# Patient Record
Sex: MALE | Race: OTHER
[De-identification: names, ages, dates, MRNs, and addresses within clinical notes are randomized per-mention and may not be internally consistent; named-entity substitution may affect disease eponyms.]

---

## 2022-03-27 ENCOUNTER — HOSPITAL ENCOUNTER (INPATIENT)
Dept: HOSPITAL 12 - ER | Age: 37
LOS: 4 days | Discharge: HOME HEALTH | DRG: 896 | End: 2022-03-31
Payer: MEDICARE

## 2022-03-27 VITALS — BODY MASS INDEX: 37.26 KG/M2 | WEIGHT: 210.31 LBS | HEIGHT: 63 IN

## 2022-03-27 VITALS — SYSTOLIC BLOOD PRESSURE: 99 MMHG | DIASTOLIC BLOOD PRESSURE: 67 MMHG

## 2022-03-27 VITALS — DIASTOLIC BLOOD PRESSURE: 61 MMHG | SYSTOLIC BLOOD PRESSURE: 103 MMHG

## 2022-03-27 DIAGNOSIS — F14.10: ICD-10-CM

## 2022-03-27 DIAGNOSIS — E88.09: ICD-10-CM

## 2022-03-27 DIAGNOSIS — Z79.4: ICD-10-CM

## 2022-03-27 DIAGNOSIS — N39.0: ICD-10-CM

## 2022-03-27 DIAGNOSIS — G90.8: ICD-10-CM

## 2022-03-27 DIAGNOSIS — E11.65: ICD-10-CM

## 2022-03-27 DIAGNOSIS — E66.9: ICD-10-CM

## 2022-03-27 DIAGNOSIS — Z71.3: ICD-10-CM

## 2022-03-27 DIAGNOSIS — G40.89: ICD-10-CM

## 2022-03-27 DIAGNOSIS — F19.10: ICD-10-CM

## 2022-03-27 DIAGNOSIS — Z20.822: ICD-10-CM

## 2022-03-27 DIAGNOSIS — F17.210: ICD-10-CM

## 2022-03-27 DIAGNOSIS — R07.9: ICD-10-CM

## 2022-03-27 DIAGNOSIS — E44.1: ICD-10-CM

## 2022-03-27 DIAGNOSIS — Z71.6: ICD-10-CM

## 2022-03-27 DIAGNOSIS — I46.9: ICD-10-CM

## 2022-03-27 DIAGNOSIS — R55: ICD-10-CM

## 2022-03-27 DIAGNOSIS — Z79.84: ICD-10-CM

## 2022-03-27 DIAGNOSIS — J45.909: ICD-10-CM

## 2022-03-27 DIAGNOSIS — E78.5: ICD-10-CM
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
## 2022-03-27 DIAGNOSIS — D72.828: ICD-10-CM

## 2022-03-27 DIAGNOSIS — F10.139: Primary | ICD-10-CM

## 2022-03-27 DIAGNOSIS — E83.51: ICD-10-CM

## 2022-03-27 LAB
ALP SERPL-CCNC: 87 U/L (ref 50–136)
ALT SERPL W/O P-5'-P-CCNC: 59 U/L (ref 16–63)
AMPHETAMINES UR QL SCN>1000 NG/ML: NEGATIVE
APPEARANCE UR: CLEAR
AST SERPL-CCNC: 22 U/L (ref 15–37)
BILIRUB DIRECT SERPL-MCNC: 0.1 MG/DL (ref 0–0.2)
BILIRUB SERPL-MCNC: 0.2 MG/DL (ref 0.2–1)
BILIRUB UR QL STRIP: NEGATIVE
BUN SERPL-MCNC: 14 MG/DL (ref 7–18)
CHLORIDE SERPL-SCNC: 99 MMOL/L (ref 98–107)
CO2 SERPL-SCNC: 33 MMOL/L (ref 21–32)
COCAINE UR QL SCN: NEGATIVE
COLOR UR: (no result)
CREAT SERPL-MCNC: 0.6 MG/DL (ref 0.6–1.3)
DEPRECATED SQUAMOUS URNS QL MICRO: (no result) /HPF
ETHANOL SERPL-MCNC: < 3 MG/DL (ref 0–0)
GLUCOSE SERPL-MCNC: 172 MG/DL (ref 74–106)
GLUCOSE UR STRIP-MCNC: NEGATIVE MG/DL
HCT VFR BLD AUTO: 41.6 % (ref 36.7–47.1)
HGB UR QL STRIP: NEGATIVE
KETONES UR STRIP-MCNC: NEGATIVE MG/DL
LEUKOCYTE ESTERASE UR QL STRIP: (no result)
MCH RBC QN AUTO: 26.2 UUG (ref 23.8–33.4)
MCV RBC AUTO: 81 FL (ref 73–96.2)
NITRITE UR QL STRIP: NEGATIVE
OPIATES UR QL SCN: NEGATIVE
PCP UR QL SCN>25 NG/ML: NEGATIVE
PH UR STRIP: 6 [PH] (ref 5–8)
PLATELET # BLD AUTO: 265 K/UL (ref 152–348)
POTASSIUM SERPL-SCNC: 4.1 MMOL/L (ref 3.5–5.1)
RBC #/AREA URNS HPF: (no result) /HPF (ref 0–3)
SP GR UR STRIP: 1.01 (ref 1–1.03)
THC UR QL SCN>50 NG/ML: NEGATIVE
UROBILINOGEN UR STRIP-MCNC: 0.2 E.U./DL
WBC #/AREA URNS HPF: (no result) /HPF
WBC #/AREA URNS HPF: (no result) /HPF (ref 0–3)
WS STN SPEC: 6.6 G/DL (ref 6.4–8.2)

## 2022-03-27 PROCEDURE — G0480 DRUG TEST DEF 1-7 CLASSES: HCPCS

## 2022-03-27 PROCEDURE — A4663 DIALYSIS BLOOD PRESSURE CUFF: HCPCS

## 2022-03-27 PROCEDURE — G0378 HOSPITAL OBSERVATION PER HR: HCPCS

## 2022-03-27 RX ADMIN — Medication PRN MG: at 22:14

## 2022-03-27 RX ADMIN — NICOTINE SCH MG: 21 PATCH, EXTENDED RELEASE TOPICAL at 22:14

## 2022-03-27 RX ADMIN — HYDROCODONE BITARTRATE AND ACETAMINOPHEN PRN TAB: 5; 325 TABLET ORAL at 22:26

## 2022-03-27 RX ADMIN — Medication SCH EACH: at 21:39

## 2022-03-27 RX ADMIN — DOCUSATE SODIUM SCH MG: 100 CAPSULE, LIQUID FILLED ORAL at 21:34

## 2022-03-27 RX ADMIN — SULFAMETHOXAZOLE AND TRIMETHOPRIM SCH TAB: 800; 160 TABLET ORAL at 21:34

## 2022-03-27 RX ADMIN — SODIUM CHLORIDE PRN UNIT: 9 INJECTION, SOLUTION INTRAVENOUS at 21:43

## 2022-03-27 NOTE — NUR
at 2214h Ambien 5mg prn given to pt as per pt request for sleep. At 2226H Norco 5-325 mg prn 
1 tablet given  for stomach pain. Pt tolerated it well. After an hour pt Ambien and Norco 
effective because pt is sleeping soundly with no acute distress. Will continue to monitor.

## 2022-03-27 NOTE — NUR
Received pt in no acute distress. Pt alert and orientedx3. Admission process and care plan 
initiated. Belonging list done. Skilled nursing assessment done. Safety and comfort 
provided. Will continue to monitor.

## 2022-03-27 NOTE — NUR
ADMITTED FROM ER A 35 YO MALE ALERT AND RESPONDING APPROPRIATELY WITH SIMPLE QUESTIONS, 
ORIENTED TO ROOM, VS TAKEN. DINNER SERVED WITHOUT ANY SIGNS OF ASPIRATION. REPORT GIVEN TO 
NIGHT SHIFT

## 2022-03-28 VITALS — SYSTOLIC BLOOD PRESSURE: 108 MMHG | DIASTOLIC BLOOD PRESSURE: 67 MMHG

## 2022-03-28 VITALS — SYSTOLIC BLOOD PRESSURE: 100 MMHG | DIASTOLIC BLOOD PRESSURE: 63 MMHG

## 2022-03-28 VITALS — DIASTOLIC BLOOD PRESSURE: 59 MMHG | SYSTOLIC BLOOD PRESSURE: 93 MMHG

## 2022-03-28 VITALS — DIASTOLIC BLOOD PRESSURE: 71 MMHG | SYSTOLIC BLOOD PRESSURE: 103 MMHG

## 2022-03-28 VITALS — SYSTOLIC BLOOD PRESSURE: 100 MMHG | DIASTOLIC BLOOD PRESSURE: 70 MMHG

## 2022-03-28 VITALS — SYSTOLIC BLOOD PRESSURE: 100 MMHG | DIASTOLIC BLOOD PRESSURE: 60 MMHG

## 2022-03-28 LAB
BUN SERPL-MCNC: 12 MG/DL (ref 7–18)
CHLORIDE SERPL-SCNC: 101 MMOL/L (ref 98–107)
CHOLEST SERPL-MCNC: 166 MG/DL (ref ?–200)
CO2 SERPL-SCNC: 29 MMOL/L (ref 21–32)
CREAT SERPL-MCNC: 0.6 MG/DL (ref 0.6–1.3)
GLUCOSE SERPL-MCNC: 176 MG/DL (ref 74–106)
HCT VFR BLD AUTO: 40 % (ref 36.7–47.1)
HDLC SERPL-MCNC: 38 MG/DL (ref 40–60)
MAGNESIUM SERPL-MCNC: 1.9 MG/DL (ref 1.8–2.4)
MCH RBC QN AUTO: 26.6 UUG (ref 23.8–33.4)
MCV RBC AUTO: 81.4 FL (ref 73–96.2)
PHOSPHATE SERPL-MCNC: 3.9 MG/DL (ref 2.5–4.9)
PLATELET # BLD AUTO: 234 K/UL (ref 152–348)
POTASSIUM SERPL-SCNC: 4 MMOL/L (ref 3.5–5.1)
TRIGL SERPL-MCNC: 144 MG/DL (ref 30–150)
TSH SERPL DL<=0.005 MIU/L-ACNC: 1.57 MIU/ML (ref 0.36–3.74)

## 2022-03-28 RX ADMIN — Medication SCH EACH: at 06:33

## 2022-03-28 RX ADMIN — SULFAMETHOXAZOLE AND TRIMETHOPRIM SCH TAB: 800; 160 TABLET ORAL at 20:34

## 2022-03-28 RX ADMIN — HYDROCODONE BITARTRATE AND ACETAMINOPHEN PRN TAB: 5; 325 TABLET ORAL at 23:00

## 2022-03-28 RX ADMIN — PANTOPRAZOLE SODIUM SCH MG: 40 TABLET, DELAYED RELEASE ORAL at 06:32

## 2022-03-28 RX ADMIN — FOLIC ACID SCH MG: 1 TABLET ORAL at 08:47

## 2022-03-28 RX ADMIN — SODIUM CHLORIDE PRN UNIT: 9 INJECTION, SOLUTION INTRAVENOUS at 20:36

## 2022-03-28 RX ADMIN — DOCUSATE SODIUM SCH MG: 100 CAPSULE, LIQUID FILLED ORAL at 20:34

## 2022-03-28 RX ADMIN — ATORVASTATIN CALCIUM SCH MG: 40 TABLET, FILM COATED ORAL at 17:16

## 2022-03-28 RX ADMIN — Medication SCH MG: at 08:46

## 2022-03-28 RX ADMIN — Medication SCH EACH: at 11:32

## 2022-03-28 RX ADMIN — SODIUM CHLORIDE PRN UNIT: 9 INJECTION, SOLUTION INTRAVENOUS at 18:31

## 2022-03-28 RX ADMIN — SULFAMETHOXAZOLE AND TRIMETHOPRIM SCH TAB: 800; 160 TABLET ORAL at 08:46

## 2022-03-28 RX ADMIN — HYDROCODONE BITARTRATE AND ACETAMINOPHEN PRN TAB: 5; 325 TABLET ORAL at 08:47

## 2022-03-28 RX ADMIN — NICOTINE SCH MG: 21 PATCH, EXTENDED RELEASE TOPICAL at 10:45

## 2022-03-28 RX ADMIN — Medication SCH EACH: at 20:34

## 2022-03-28 RX ADMIN — Medication SCH EACH: at 16:42

## 2022-03-28 RX ADMIN — SODIUM CHLORIDE PRN MG: 9 INJECTION, SOLUTION INTRAVENOUS at 23:50

## 2022-03-28 NOTE — NUR
Patient remains afebrile. No seizure event noted. Rails remained padded. Bed alarm on. 
Denies pain and SOB. Remains A/O x4. TELE SR at 81. Comfort and safety measures maintained 
t/o shift. All meds given as ordered. All needs met.

## 2022-03-28 NOTE — NUR
Received patient laying in bed. Awake, alert oriented x 3. Denies pain or SOB. Tele SR on 
room air. Pt. able to walk to the restroom but with 1 per assist. Safety initiated. Call 
light within reach. Will continue to monitor.

## 2022-03-28 NOTE — NUR
Pt slept comfortably.Prescribed medication given and pt tolerated it well. Pt had tendency 
to not answer staff . Pt on sinus rhythm. Seizure precaution noted. Vital signs within 
normal limit. Safety and comfort provided. Will endorse to incoming nurse.

## 2022-03-28 NOTE — NUR
Patient was found sitting on the floor. Patient stated, "I was trying to get comfortable, 
had a seizure and fell." Patient claimed he hit his head on the floor and that his head and 
chest hurts. VS taken T=98.9, HR=74, RR=16, /80, O2 sat= 97%. No events noted on the 
monitor. No signs of swelling nor hematoma noted on patient's head. Patient was placed back 
on the bed, bed alarm on and call light within reach. Charge nurse Srinivasan, nursing 
supervisor Isa Alvarez [place] and Dr Hernandez made aware. Per Dr. Hernandez, no new orders, just 
observe. Will continue to monitor closely.

## 2022-03-28 NOTE — NUR
Patient leaning to siderails, dangling legs, wanting to get up. Reoriented patient, 
explained risk and benefits x3. Verbalized understanding. Placed patient back to bed. Will 
continue to monitor closely.

## 2022-03-28 NOTE — NUR
ORTHOSTATIC VITAL SIGNS

Laying 107/70 HR 73

Sitting 104/73 HR 77

Standing 100/60 HR 81



Will continue to monitor.

## 2022-03-28 NOTE — NUR
Patient was found sitting on the floor. Patient stated, "I was trying to get comfortable, 
had a seizure and fell." Patient claimed he hit his head on the floor and that his head and 
chest hurts. VS taken T=98.9, HR=74, RR=16, /80, O2 sat= 97%. No events noted on the 
monitor. No signs of swelling nor hematoma noted on patient's head. Patient was placed back 
on the bed, bed alarm on and call light within reach. Charge nurse Srinivasan, nursing 
supervisor Isa Alvarez [place] and Dr Hernanedz made aware. Per Dr. Hernandez, no new orders, just 
observe. Will continue to monitor closely. 

-------------------------------------------------------------------------------

Addendum: 03/28/22 at 2144 by Minal Sam RN

-------------------------------------------------------------------------------

Wrong time.

## 2022-03-29 VITALS — DIASTOLIC BLOOD PRESSURE: 63 MMHG | SYSTOLIC BLOOD PRESSURE: 98 MMHG

## 2022-03-29 VITALS — DIASTOLIC BLOOD PRESSURE: 57 MMHG | SYSTOLIC BLOOD PRESSURE: 96 MMHG

## 2022-03-29 VITALS — DIASTOLIC BLOOD PRESSURE: 73 MMHG | SYSTOLIC BLOOD PRESSURE: 104 MMHG

## 2022-03-29 VITALS — SYSTOLIC BLOOD PRESSURE: 110 MMHG | DIASTOLIC BLOOD PRESSURE: 71 MMHG

## 2022-03-29 VITALS — DIASTOLIC BLOOD PRESSURE: 68 MMHG | SYSTOLIC BLOOD PRESSURE: 112 MMHG

## 2022-03-29 LAB
BUN SERPL-MCNC: 10 MG/DL (ref 7–18)
CHLORIDE SERPL-SCNC: 100 MMOL/L (ref 98–107)
CO2 SERPL-SCNC: 33 MMOL/L (ref 21–32)
CREAT SERPL-MCNC: 0.8 MG/DL (ref 0.6–1.3)
GLUCOSE SERPL-MCNC: 197 MG/DL (ref 74–106)
HCT VFR BLD AUTO: 43.6 % (ref 36.7–47.1)
MAGNESIUM SERPL-MCNC: 2.1 MG/DL (ref 1.8–2.4)
MCH RBC QN AUTO: 26.8 UUG (ref 23.8–33.4)
MCV RBC AUTO: 80.7 FL (ref 73–96.2)
PHOSPHATE SERPL-MCNC: 4.5 MG/DL (ref 2.5–4.9)
PLATELET # BLD AUTO: 259 K/UL (ref 152–348)
POTASSIUM SERPL-SCNC: 4.1 MMOL/L (ref 3.5–5.1)

## 2022-03-29 RX ADMIN — NICOTINE SCH MG: 21 PATCH, EXTENDED RELEASE TOPICAL at 08:13

## 2022-03-29 RX ADMIN — SODIUM CHLORIDE PRN UNIT: 9 INJECTION, SOLUTION INTRAVENOUS at 21:11

## 2022-03-29 RX ADMIN — Medication SCH EACH: at 21:09

## 2022-03-29 RX ADMIN — Medication PRN MG: at 00:31

## 2022-03-29 RX ADMIN — ATORVASTATIN CALCIUM SCH MG: 40 TABLET, FILM COATED ORAL at 17:01

## 2022-03-29 RX ADMIN — DOCUSATE SODIUM SCH MG: 100 CAPSULE, LIQUID FILLED ORAL at 20:44

## 2022-03-29 RX ADMIN — SULFAMETHOXAZOLE AND TRIMETHOPRIM SCH TAB: 800; 160 TABLET ORAL at 08:13

## 2022-03-29 RX ADMIN — SODIUM CHLORIDE PRN UNIT: 9 INJECTION, SOLUTION INTRAVENOUS at 11:55

## 2022-03-29 RX ADMIN — HYDROCODONE BITARTRATE AND ACETAMINOPHEN PRN TAB: 5; 325 TABLET ORAL at 08:20

## 2022-03-29 RX ADMIN — PRAZOSIN HYDROCHLORIDE SCH MG: 1 CAPSULE ORAL at 20:46

## 2022-03-29 RX ADMIN — Medication SCH EACH: at 06:37

## 2022-03-29 RX ADMIN — SODIUM CHLORIDE PRN UNIT: 9 INJECTION, SOLUTION INTRAVENOUS at 08:13

## 2022-03-29 RX ADMIN — SULFAMETHOXAZOLE AND TRIMETHOPRIM SCH TAB: 800; 160 TABLET ORAL at 20:44

## 2022-03-29 RX ADMIN — Medication SCH MG: at 08:13

## 2022-03-29 RX ADMIN — TRAZODONE HYDROCHLORIDE SCH MG: 50 TABLET ORAL at 20:44

## 2022-03-29 RX ADMIN — Medication SCH EACH: at 11:53

## 2022-03-29 RX ADMIN — FOLIC ACID SCH MG: 1 TABLET ORAL at 08:13

## 2022-03-29 RX ADMIN — Medication SCH EACH: at 16:19

## 2022-03-29 RX ADMIN — ESCITALOPRAM OXALATE SCH MG: 10 TABLET, FILM COATED ORAL at 13:17

## 2022-03-29 RX ADMIN — PANTOPRAZOLE SODIUM SCH MG: 40 TABLET, DELAYED RELEASE ORAL at 06:06

## 2022-03-29 RX ADMIN — SODIUM CHLORIDE PRN UNIT: 9 INJECTION, SOLUTION INTRAVENOUS at 16:20

## 2022-03-29 NOTE — NUR
no significant change from morning assessment. sr on telemonitor. no episodes of sob/chest 
pain/seizure. cont seizure and fall precautions at all times.

## 2022-03-29 NOTE — NUR
At this time case management Raul called and informed charge R.N that due to Insurance 
reasons (no coverage for ALS transportation) CM unable to arranged ALS transportation to [facility] 
tomorrow for scheduled cardiac CTA. Cardiologist Dr. Zaidi informed and not happy with 
outcome but agreed to have BLS transportation despite been contra-indicated pt. needs 
procedure to be done.

## 2022-03-29 NOTE — NUR
received sitting by the window comfortable. asked to go back to bed and assisted by 2 
licensed nurses. pt is currently eating comfortably. denies n/v/sob. bed at lowest, padded 
siderails in place, bed alarm on, call light in place. assisted with needs. cont to monitor

## 2022-03-29 NOTE — NUR
Called ARELIS and chi with Luís re: transportation to [facility] for CT angio.  time 
0830am tomorrow 3/30/22.

## 2022-03-29 NOTE — NUR
Patient c/o generalized pain states norco is ineffective requested Morphine instead. 
Informed NP Ke with new order noted.

## 2022-03-29 NOTE — NUR
A call from [facility] and at this time primary nurse Марина informed that pt. is schedule to have 
Cardiac CTA at [facility] at 1000. Care coordinator in charge of arranging ACLS transportation. 
Awaiting for information.

## 2022-03-29 NOTE — NUR
A call from [facility] to Mr. Garcia and at this time He was informed that due to transport delayed 
procedure approved by cardiologist Dr. Zaidi to be done any time tomorrow. Mr. Soliz 
stated "I'll be calling you earlier tomorrow to let you know at what time we'll be ready for 
procedure on 03/30/21." primary nurse nurse and supervisor updated on care plan.

## 2022-03-30 ENCOUNTER — HOSPITAL ENCOUNTER (OUTPATIENT)
Dept: HOSPITAL 54 - CT | Age: 37
Discharge: HOME | End: 2022-03-30
Attending: INTERNAL MEDICINE
Payer: MEDICARE

## 2022-03-30 VITALS — DIASTOLIC BLOOD PRESSURE: 75 MMHG | SYSTOLIC BLOOD PRESSURE: 104 MMHG

## 2022-03-30 VITALS — SYSTOLIC BLOOD PRESSURE: 94 MMHG | DIASTOLIC BLOOD PRESSURE: 63 MMHG

## 2022-03-30 VITALS — DIASTOLIC BLOOD PRESSURE: 63 MMHG | SYSTOLIC BLOOD PRESSURE: 100 MMHG

## 2022-03-30 VITALS — HEIGHT: 64 IN | BODY MASS INDEX: 35.68 KG/M2 | WEIGHT: 209 LBS

## 2022-03-30 VITALS — SYSTOLIC BLOOD PRESSURE: 121 MMHG | DIASTOLIC BLOOD PRESSURE: 79 MMHG

## 2022-03-30 VITALS — SYSTOLIC BLOOD PRESSURE: 97 MMHG | DIASTOLIC BLOOD PRESSURE: 62 MMHG

## 2022-03-30 DIAGNOSIS — R07.9: Primary | ICD-10-CM

## 2022-03-30 PROCEDURE — 75574 CT ANGIO HRT W/3D IMAGE: CPT

## 2022-03-30 RX ADMIN — SODIUM CHLORIDE PRN UNIT: 9 INJECTION, SOLUTION INTRAVENOUS at 09:32

## 2022-03-30 RX ADMIN — SODIUM CHLORIDE PRN UNIT: 9 INJECTION, SOLUTION INTRAVENOUS at 20:28

## 2022-03-30 RX ADMIN — SODIUM CHLORIDE PRN UNIT: 9 INJECTION, SOLUTION INTRAVENOUS at 11:44

## 2022-03-30 RX ADMIN — ATORVASTATIN CALCIUM SCH MG: 40 TABLET, FILM COATED ORAL at 17:04

## 2022-03-30 RX ADMIN — Medication PRN MG: at 10:40

## 2022-03-30 RX ADMIN — HYDROCODONE BITARTRATE AND ACETAMINOPHEN PRN TAB: 5; 325 TABLET ORAL at 17:41

## 2022-03-30 RX ADMIN — ESCITALOPRAM OXALATE SCH MG: 10 TABLET, FILM COATED ORAL at 09:00

## 2022-03-30 RX ADMIN — SODIUM CHLORIDE PRN MG: 9 INJECTION, SOLUTION INTRAVENOUS at 23:34

## 2022-03-30 RX ADMIN — SULFAMETHOXAZOLE AND TRIMETHOPRIM SCH TAB: 800; 160 TABLET ORAL at 20:26

## 2022-03-30 RX ADMIN — SODIUM CHLORIDE PRN MG: 9 INJECTION, SOLUTION INTRAVENOUS at 13:44

## 2022-03-30 RX ADMIN — NICOTINE SCH MG: 21 PATCH, EXTENDED RELEASE TOPICAL at 09:00

## 2022-03-30 RX ADMIN — Medication SCH MG: at 09:00

## 2022-03-30 RX ADMIN — Medication PRN MG: at 11:03

## 2022-03-30 RX ADMIN — Medication PRN MG: at 10:35

## 2022-03-30 RX ADMIN — Medication SCH EACH: at 11:43

## 2022-03-30 RX ADMIN — FOLIC ACID SCH MG: 1 TABLET ORAL at 09:00

## 2022-03-30 RX ADMIN — Medication SCH EACH: at 17:01

## 2022-03-30 RX ADMIN — HYDROCODONE BITARTRATE AND ACETAMINOPHEN PRN TAB: 5; 325 TABLET ORAL at 22:55

## 2022-03-30 RX ADMIN — PRAZOSIN HYDROCHLORIDE SCH MG: 1 CAPSULE ORAL at 20:27

## 2022-03-30 RX ADMIN — SULFAMETHOXAZOLE AND TRIMETHOPRIM SCH TAB: 800; 160 TABLET ORAL at 09:00

## 2022-03-30 RX ADMIN — Medication SCH EACH: at 06:43

## 2022-03-30 RX ADMIN — SODIUM CHLORIDE PRN UNIT: 9 INJECTION, SOLUTION INTRAVENOUS at 17:04

## 2022-03-30 RX ADMIN — PANTOPRAZOLE SODIUM SCH MG: 40 TABLET, DELAYED RELEASE ORAL at 06:28

## 2022-03-30 RX ADMIN — Medication SCH EACH: at 20:23

## 2022-03-30 RX ADMIN — TRAZODONE HYDROCHLORIDE SCH MG: 50 TABLET ORAL at 20:26

## 2022-03-30 RX ADMIN — HYDROCODONE BITARTRATE AND ACETAMINOPHEN PRN TAB: 5; 325 TABLET ORAL at 13:44

## 2022-03-30 RX ADMIN — DOCUSATE SODIUM SCH MG: 100 CAPSULE, LIQUID FILLED ORAL at 20:26

## 2022-03-30 NOTE — NUR
patient returned from [facility] from chest ct angiogram with note that patients contrast bled 
through the right arm and with order to apply warm compress times 10 hours, patient arrived 
with new iv site left ac 18g, report was given that patient received 50mg metoprolol. 
patient upon arrival with v/s wnl, warm compress applied to right arm, no c/o pain at this 
time.

## 2022-03-30 NOTE — NUR
Patient transferred back to [facility] via ambulance in stable condition. Report given 
to EMS.

## 2022-03-31 VITALS — SYSTOLIC BLOOD PRESSURE: 100 MMHG | DIASTOLIC BLOOD PRESSURE: 63 MMHG

## 2022-03-31 VITALS — SYSTOLIC BLOOD PRESSURE: 109 MMHG | DIASTOLIC BLOOD PRESSURE: 78 MMHG

## 2022-03-31 VITALS — SYSTOLIC BLOOD PRESSURE: 92 MMHG | DIASTOLIC BLOOD PRESSURE: 51 MMHG

## 2022-03-31 VITALS — DIASTOLIC BLOOD PRESSURE: 58 MMHG | SYSTOLIC BLOOD PRESSURE: 90 MMHG

## 2022-03-31 RX ADMIN — Medication PRN MG: at 02:28

## 2022-03-31 RX ADMIN — Medication SCH EACH: at 12:22

## 2022-03-31 RX ADMIN — HYDROCODONE BITARTRATE AND ACETAMINOPHEN PRN TAB: 5; 325 TABLET ORAL at 08:22

## 2022-03-31 RX ADMIN — NICOTINE SCH MG: 21 PATCH, EXTENDED RELEASE TOPICAL at 08:22

## 2022-03-31 RX ADMIN — FOLIC ACID SCH MG: 1 TABLET ORAL at 08:22

## 2022-03-31 RX ADMIN — SULFAMETHOXAZOLE AND TRIMETHOPRIM SCH TAB: 800; 160 TABLET ORAL at 08:22

## 2022-03-31 RX ADMIN — Medication SCH EACH: at 06:40

## 2022-03-31 RX ADMIN — PANTOPRAZOLE SODIUM SCH MG: 40 TABLET, DELAYED RELEASE ORAL at 06:06

## 2022-03-31 RX ADMIN — Medication SCH MG: at 08:22

## 2022-03-31 RX ADMIN — ESCITALOPRAM OXALATE SCH MG: 10 TABLET, FILM COATED ORAL at 08:21

## 2022-03-31 RX ADMIN — SODIUM CHLORIDE PRN UNIT: 9 INJECTION, SOLUTION INTRAVENOUS at 12:23

## 2022-03-31 NOTE — NUR
Patient IV removed with catheters intact. discharge information given, taxi voucher given 
and taken to discharge area where he was met by [employer].  Patient placed in taxi with 
walker as per discharge orders.  Home health company information given to patient.

## 2022-03-31 NOTE — NUR
Patient slept intermittently c/o gen body pain .Medicated with Norco x1 during the shift 
with good result. NSR on Tele HR 74.On Ra.No s/s of distress noted. Voided well using 
urinal.IV patent and intact on left hand 20 g and Left AC 18 g.No seizure noted. Continue 
seizure precaution. Call light with in reach.All needs anticipated and met accordingly. Will 
endorse to oncoming shift.

## 2022-07-30 ENCOUNTER — HOSPITAL ENCOUNTER (INPATIENT)
Dept: HOSPITAL 12 - ER | Age: 37
LOS: 4 days | Discharge: HOME | DRG: 176 | End: 2022-08-03
Payer: MEDICARE

## 2022-07-30 VITALS — HEIGHT: 64 IN | WEIGHT: 226.19 LBS | BODY MASS INDEX: 38.62 KG/M2

## 2022-07-30 DIAGNOSIS — Z90.49: ICD-10-CM

## 2022-07-30 DIAGNOSIS — Z79.4: ICD-10-CM

## 2022-07-30 DIAGNOSIS — E78.5: ICD-10-CM

## 2022-07-30 DIAGNOSIS — Z71.3: ICD-10-CM

## 2022-07-30 DIAGNOSIS — I26.94: Primary | ICD-10-CM

## 2022-07-30 DIAGNOSIS — F19.10: ICD-10-CM

## 2022-07-30 DIAGNOSIS — F32.A: ICD-10-CM

## 2022-07-30 DIAGNOSIS — Z79.82: ICD-10-CM

## 2022-07-30 DIAGNOSIS — R59.0: ICD-10-CM

## 2022-07-30 DIAGNOSIS — Z88.0: ICD-10-CM

## 2022-07-30 DIAGNOSIS — Z91.048: ICD-10-CM

## 2022-07-30 DIAGNOSIS — Z79.899: ICD-10-CM

## 2022-07-30 DIAGNOSIS — E66.2: ICD-10-CM

## 2022-07-30 DIAGNOSIS — I26.99: ICD-10-CM

## 2022-07-30 DIAGNOSIS — J44.9: ICD-10-CM

## 2022-07-30 DIAGNOSIS — I10: ICD-10-CM

## 2022-07-30 DIAGNOSIS — D68.59: ICD-10-CM

## 2022-07-30 DIAGNOSIS — Z86.73: ICD-10-CM

## 2022-07-30 DIAGNOSIS — F17.210: ICD-10-CM

## 2022-07-30 DIAGNOSIS — E11.65: ICD-10-CM

## 2022-07-30 LAB
ALP SERPL-CCNC: 95 U/L (ref 50–136)
ALT SERPL W/O P-5'-P-CCNC: 57 U/L (ref 16–63)
AST SERPL-CCNC: 22 U/L (ref 15–37)
BILIRUB DIRECT SERPL-MCNC: 0.1 MG/DL (ref 0–0.2)
BILIRUB SERPL-MCNC: 0.2 MG/DL (ref 0.2–1)
BUN SERPL-MCNC: 13 MG/DL (ref 7–18)
CHLORIDE SERPL-SCNC: 99 MMOL/L (ref 98–107)
CO2 SERPL-SCNC: 28 MMOL/L (ref 21–32)
CREAT SERPL-MCNC: 0.7 MG/DL (ref 0.6–1.3)
GLUCOSE SERPL-MCNC: 108 MG/DL (ref 74–106)
HCT VFR BLD AUTO: 42.6 % (ref 36.7–47.1)
MCH RBC QN AUTO: 25.1 UUG (ref 23.8–33.4)
MCV RBC AUTO: 76.7 FL (ref 73–96.2)
PLATELET # BLD AUTO: 233 K/UL (ref 152–348)
POTASSIUM SERPL-SCNC: 3.7 MMOL/L (ref 3.5–5.1)
WS STN SPEC: 7.2 G/DL (ref 6.4–8.2)

## 2022-07-30 PROCEDURE — G0378 HOSPITAL OBSERVATION PER HR: HCPCS

## 2022-07-30 PROCEDURE — A4663 DIALYSIS BLOOD PRESSURE CUFF: HCPCS

## 2022-07-30 NOTE — NUR
DR: MOHAMUD at b/s spoked with patient requesting some thing to eat as per 
patient he need something to settle his stomach ,given crackers and saltines 
,milk and water .

## 2022-07-30 NOTE — NUR
o2 saturation 91 to 93 % ON ROOM  AIR , as per patient he has history of asthma 
,placed 02 nasal cannula at 3 l/min now oxygen  96 %.

## 2022-07-31 VITALS — DIASTOLIC BLOOD PRESSURE: 70 MMHG | SYSTOLIC BLOOD PRESSURE: 104 MMHG

## 2022-07-31 VITALS — DIASTOLIC BLOOD PRESSURE: 49 MMHG | SYSTOLIC BLOOD PRESSURE: 91 MMHG

## 2022-07-31 VITALS — SYSTOLIC BLOOD PRESSURE: 101 MMHG | DIASTOLIC BLOOD PRESSURE: 61 MMHG

## 2022-07-31 VITALS — DIASTOLIC BLOOD PRESSURE: 51 MMHG | SYSTOLIC BLOOD PRESSURE: 94 MMHG

## 2022-07-31 VITALS — SYSTOLIC BLOOD PRESSURE: 92 MMHG | DIASTOLIC BLOOD PRESSURE: 52 MMHG

## 2022-07-31 RX ADMIN — ENOXAPARIN SODIUM SCH MG: 100 INJECTION SUBCUTANEOUS at 20:44

## 2022-07-31 RX ADMIN — ASPIRIN SCH MG: 81 TABLET, CHEWABLE ORAL at 08:35

## 2022-07-31 RX ADMIN — ENOXAPARIN SODIUM SCH MG: 100 INJECTION SUBCUTANEOUS at 09:43

## 2022-07-31 RX ADMIN — ESCITALOPRAM OXALATE SCH MG: 10 TABLET, FILM COATED ORAL at 08:35

## 2022-07-31 RX ADMIN — FOLIC ACID SCH MG: 1 TABLET ORAL at 08:35

## 2022-07-31 RX ADMIN — PRAZOSIN HYDROCHLORIDE SCH MG: 1 CAPSULE ORAL at 20:48

## 2022-07-31 RX ADMIN — ATORVASTATIN CALCIUM SCH MG: 40 TABLET, FILM COATED ORAL at 17:09

## 2022-07-31 RX ADMIN — Medication SCH MG: at 08:35

## 2022-07-31 NOTE — NUR
called security : MOHAMUD at b/s explaing to patient that he cannot eat food 
from outside patient ordered food called security patient was upset and 
refusing doctors advised .

## 2022-07-31 NOTE — NUR
DR: MOHAMUD AT B/S AND DISCUSSED WITH PATIENT THAT HE IS DIABETIC AND HE CANNOT 
EAT FOOD AND CANNOT ORDER HIS FOOD FROM OUTSIDE .

## 2022-07-31 NOTE — NUR
patient moved to telemetry room 309 via [facility] . patient went with all 
belongings and copy of the chart . v/s leoncio mills went with patient .

## 2022-07-31 NOTE — NUR
Admitted this 35 y/o male from ER via gurney, awake alert and oriented x 3, in no acute 
distress. Sinus rhythm on tele with HR 94bmp. IV access to left AC intact and patent. 
Routine admission care done, plan of care initiated. Health teachings provided such as 
compliance with meds and prescribed diet related to DM. Patient insist to eat the food he 
ordered outside despite explaining risks and benefits. Needs assessed and attended to. Bed 
in locked and low position with call light within easy reach.

## 2022-08-01 VITALS — SYSTOLIC BLOOD PRESSURE: 100 MMHG | DIASTOLIC BLOOD PRESSURE: 71 MMHG

## 2022-08-01 VITALS — SYSTOLIC BLOOD PRESSURE: 118 MMHG | DIASTOLIC BLOOD PRESSURE: 62 MMHG

## 2022-08-01 VITALS — SYSTOLIC BLOOD PRESSURE: 117 MMHG | DIASTOLIC BLOOD PRESSURE: 78 MMHG

## 2022-08-01 VITALS — DIASTOLIC BLOOD PRESSURE: 64 MMHG | SYSTOLIC BLOOD PRESSURE: 96 MMHG

## 2022-08-01 VITALS — DIASTOLIC BLOOD PRESSURE: 60 MMHG | SYSTOLIC BLOOD PRESSURE: 110 MMHG

## 2022-08-01 LAB
BUN SERPL-MCNC: 11 MG/DL (ref 7–18)
CHLORIDE SERPL-SCNC: 98 MMOL/L (ref 98–107)
CO2 SERPL-SCNC: 28 MMOL/L (ref 21–32)
CREAT SERPL-MCNC: 0.7 MG/DL (ref 0.6–1.3)
GLUCOSE SERPL-MCNC: 390 MG/DL (ref 74–106)
HCT VFR BLD AUTO: 42.4 % (ref 36.7–47.1)
MAGNESIUM SERPL-MCNC: 1.8 MG/DL (ref 1.8–2.4)
MCH RBC QN AUTO: 26.6 UUG (ref 23.8–33.4)
MCV RBC AUTO: 76.6 FL (ref 73–96.2)
PHOSPHATE SERPL-MCNC: 3.2 MG/DL (ref 2.5–4.9)
PLATELET # BLD AUTO: 239 K/UL (ref 152–348)
POTASSIUM SERPL-SCNC: 4.4 MMOL/L (ref 3.5–5.1)

## 2022-08-01 RX ADMIN — PRAZOSIN HYDROCHLORIDE SCH MG: 1 CAPSULE ORAL at 20:18

## 2022-08-01 RX ADMIN — SODIUM CHLORIDE PRN UNIT: 9 INJECTION, SOLUTION INTRAVENOUS at 16:29

## 2022-08-01 RX ADMIN — ESCITALOPRAM OXALATE SCH MG: 10 TABLET, FILM COATED ORAL at 08:08

## 2022-08-01 RX ADMIN — ASPIRIN SCH MG: 81 TABLET, CHEWABLE ORAL at 08:08

## 2022-08-01 RX ADMIN — ENOXAPARIN SODIUM SCH MG: 100 INJECTION SUBCUTANEOUS at 08:08

## 2022-08-01 RX ADMIN — SODIUM CHLORIDE PRN UNIT: 9 INJECTION, SOLUTION INTRAVENOUS at 20:14

## 2022-08-01 RX ADMIN — Medication SCH MG: at 08:08

## 2022-08-01 RX ADMIN — Medication SCH EACH: at 15:59

## 2022-08-01 RX ADMIN — FOLIC ACID SCH MG: 1 TABLET ORAL at 08:08

## 2022-08-01 RX ADMIN — Medication SCH EACH: at 20:17

## 2022-08-01 RX ADMIN — ENOXAPARIN SODIUM SCH MG: 100 INJECTION SUBCUTANEOUS at 20:16

## 2022-08-01 RX ADMIN — INSULIN GLARGINE SCH UNITS: 100 INJECTION, SOLUTION SUBCUTANEOUS at 20:16

## 2022-08-01 RX ADMIN — ATORVASTATIN CALCIUM SCH MG: 40 TABLET, FILM COATED ORAL at 17:00

## 2022-08-01 NOTE — NUR
Social work consult was requested for a patient on [facility] for substance abuse resources. 
Patient is 36-year-old male admitted to the hospital for pulmonary embolism. Patient is 
alert and oriented X3. Patient could not state the date. Patient presents with congruent 
mood and full range affect. Patient presents with poor judgement and insight. Patient states 
is primary  is his mother, Isa ([phone]) who lives at [address], [place] [address], and they have a good relationship. Patient states that he 
lives with his mother, Isa and stepfather at [address], [address], [place] 
[address]. Patient states that he is currently working as a  for a year.  
Patient has a walker at home, is independent with his ADLs, and is not currently driving. 



Patient states that he has a history of alcohol and cocaine abuse. Patient states he has 
been sober for 5 months and has been going to AA meetings a couple times a week. The 
toxicology screening is not updated. SW offered patient substance abuse resources and 
patient refused. SW placed the resources in the patients chart. Patient states he has a 
history of schizoaffective disorder. Patient states he has a therapist that he sees once a 
week. Patient states he has a psychiatrist and is taking medication. Patient denies suicidal 
or homicidal ideation.  The discharge plan is to go back to home to [address], [address], [place] [address].

## 2022-08-02 VITALS — SYSTOLIC BLOOD PRESSURE: 101 MMHG | DIASTOLIC BLOOD PRESSURE: 69 MMHG

## 2022-08-02 VITALS — DIASTOLIC BLOOD PRESSURE: 41 MMHG | SYSTOLIC BLOOD PRESSURE: 90 MMHG

## 2022-08-02 VITALS — DIASTOLIC BLOOD PRESSURE: 61 MMHG | SYSTOLIC BLOOD PRESSURE: 102 MMHG

## 2022-08-02 VITALS — DIASTOLIC BLOOD PRESSURE: 76 MMHG | SYSTOLIC BLOOD PRESSURE: 110 MMHG

## 2022-08-02 VITALS — DIASTOLIC BLOOD PRESSURE: 71 MMHG | SYSTOLIC BLOOD PRESSURE: 108 MMHG

## 2022-08-02 LAB
BUN SERPL-MCNC: 10 MG/DL (ref 7–18)
CHLORIDE SERPL-SCNC: 100 MMOL/L (ref 98–107)
CO2 SERPL-SCNC: 33 MMOL/L (ref 21–32)
CREAT SERPL-MCNC: 0.6 MG/DL (ref 0.6–1.3)
GLUCOSE SERPL-MCNC: 189 MG/DL (ref 74–106)
HCT VFR BLD AUTO: 44.8 % (ref 36.7–47.1)
MAGNESIUM SERPL-MCNC: 1.9 MG/DL (ref 1.8–2.4)
MCH RBC QN AUTO: 24.9 UUG (ref 23.8–33.4)
MCV RBC AUTO: 76.5 FL (ref 73–96.2)
PHOSPHATE SERPL-MCNC: 3.7 MG/DL (ref 2.5–4.9)
PLATELET # BLD AUTO: 230 K/UL (ref 152–348)
POTASSIUM SERPL-SCNC: 3.9 MMOL/L (ref 3.5–5.1)

## 2022-08-02 RX ADMIN — ASPIRIN SCH MG: 81 TABLET, CHEWABLE ORAL at 08:09

## 2022-08-02 RX ADMIN — FOLIC ACID SCH MG: 1 TABLET ORAL at 08:09

## 2022-08-02 RX ADMIN — SODIUM CHLORIDE PRN UNIT: 9 INJECTION, SOLUTION INTRAVENOUS at 16:57

## 2022-08-02 RX ADMIN — PRAZOSIN HYDROCHLORIDE SCH MG: 1 CAPSULE ORAL at 20:05

## 2022-08-02 RX ADMIN — ENOXAPARIN SODIUM SCH MG: 100 INJECTION SUBCUTANEOUS at 08:10

## 2022-08-02 RX ADMIN — Medication SCH EACH: at 06:39

## 2022-08-02 RX ADMIN — ENOXAPARIN SODIUM SCH MG: 100 INJECTION SUBCUTANEOUS at 20:01

## 2022-08-02 RX ADMIN — INSULIN GLARGINE SCH UNITS: 100 INJECTION, SOLUTION SUBCUTANEOUS at 20:02

## 2022-08-02 RX ADMIN — Medication SCH EACH: at 20:01

## 2022-08-02 RX ADMIN — Medication SCH EACH: at 10:50

## 2022-08-02 RX ADMIN — SODIUM CHLORIDE PRN UNIT: 9 INJECTION, SOLUTION INTRAVENOUS at 08:10

## 2022-08-02 RX ADMIN — ATORVASTATIN CALCIUM SCH MG: 40 TABLET, FILM COATED ORAL at 17:19

## 2022-08-02 RX ADMIN — SODIUM CHLORIDE PRN UNIT: 9 INJECTION, SOLUTION INTRAVENOUS at 11:09

## 2022-08-02 RX ADMIN — Medication SCH EACH: at 16:23

## 2022-08-02 RX ADMIN — ESCITALOPRAM OXALATE SCH MG: 10 TABLET, FILM COATED ORAL at 08:09

## 2022-08-02 RX ADMIN — Medication SCH MG: at 08:09

## 2022-08-02 RX ADMIN — SODIUM CHLORIDE PRN UNIT: 9 INJECTION, SOLUTION INTRAVENOUS at 20:02

## 2022-08-03 VITALS — SYSTOLIC BLOOD PRESSURE: 96 MMHG | DIASTOLIC BLOOD PRESSURE: 72 MMHG

## 2022-08-03 VITALS — SYSTOLIC BLOOD PRESSURE: 165 MMHG | DIASTOLIC BLOOD PRESSURE: 80 MMHG

## 2022-08-03 RX ADMIN — ESCITALOPRAM OXALATE SCH MG: 10 TABLET, FILM COATED ORAL at 09:14

## 2022-08-03 RX ADMIN — SODIUM CHLORIDE PRN UNIT: 9 INJECTION, SOLUTION INTRAVENOUS at 08:14

## 2022-08-03 RX ADMIN — Medication SCH MG: at 09:15

## 2022-08-03 RX ADMIN — ASPIRIN SCH MG: 81 TABLET, CHEWABLE ORAL at 09:15

## 2022-08-03 RX ADMIN — FOLIC ACID SCH MG: 1 TABLET ORAL at 09:15

## 2022-08-03 RX ADMIN — Medication SCH EACH: at 06:09

## 2022-08-03 NOTE — NUR
RESTING COMFORTABLY NO SS OF PAIN OR DISTRESS. SEEN BY HOSPITALIST AND PULMONOLOGIST AND 
CLEARED FOR DISCHARGE

## 2022-09-22 ENCOUNTER — HOSPITAL ENCOUNTER (EMERGENCY)
Dept: HOSPITAL 54 - ER | Age: 37
Discharge: HOME | End: 2022-09-22
Payer: MEDICARE

## 2022-09-22 VITALS — WEIGHT: 230 LBS | HEIGHT: 64 IN | BODY MASS INDEX: 39.27 KG/M2

## 2022-09-22 VITALS — SYSTOLIC BLOOD PRESSURE: 113 MMHG | DIASTOLIC BLOOD PRESSURE: 76 MMHG

## 2022-09-22 DIAGNOSIS — Z00.00: Primary | ICD-10-CM

## 2022-09-22 DIAGNOSIS — I25.2: ICD-10-CM

## 2022-09-22 DIAGNOSIS — E78.00: ICD-10-CM

## 2022-09-22 DIAGNOSIS — Z79.899: ICD-10-CM

## 2022-09-22 DIAGNOSIS — Z88.0: ICD-10-CM

## 2022-09-22 DIAGNOSIS — Z59.00: ICD-10-CM

## 2022-09-22 DIAGNOSIS — J45.909: ICD-10-CM

## 2022-09-22 DIAGNOSIS — E11.9: ICD-10-CM

## 2022-09-22 DIAGNOSIS — I10: ICD-10-CM

## 2022-09-22 SDOH — ECONOMIC STABILITY - HOUSING INSECURITY: HOMELESSNESS UNSPECIFIED: Z59.00

## 2022-09-22 NOTE — NUR
Discharged to previous living condition ( Rehab facility/house) picked up by 
[facility] given- Endorsed to FLORENCIA Villanueva

## 2022-09-22 NOTE — NUR
DESTINATION ADDRESS:

[place] 

[address] [facility] [id number] [phone]

## 2022-09-24 ENCOUNTER — HOSPITAL ENCOUNTER (EMERGENCY)
Dept: HOSPITAL 54 - ER | Age: 37
Discharge: HOME | End: 2022-09-24
Payer: MEDICARE

## 2022-09-24 VITALS — SYSTOLIC BLOOD PRESSURE: 134 MMHG | DIASTOLIC BLOOD PRESSURE: 70 MMHG

## 2022-09-24 VITALS — BODY MASS INDEX: 39.27 KG/M2 | WEIGHT: 230 LBS | HEIGHT: 64 IN

## 2022-09-24 DIAGNOSIS — I10: ICD-10-CM

## 2022-09-24 DIAGNOSIS — E11.9: ICD-10-CM

## 2022-09-24 DIAGNOSIS — Z88.0: ICD-10-CM
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
## 2022-09-24 DIAGNOSIS — E78.00: ICD-10-CM

## 2022-09-24 DIAGNOSIS — Z59.00: ICD-10-CM
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
## 2022-09-24 DIAGNOSIS — I25.2: ICD-10-CM
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
## 2022-09-24 DIAGNOSIS — J45.909: ICD-10-CM

## 2022-09-24 DIAGNOSIS — Z79.899: ICD-10-CM

## 2022-09-24 DIAGNOSIS — S91.111A: Primary | ICD-10-CM

## 2022-09-24 SDOH — ECONOMIC STABILITY - HOUSING INSECURITY: HOMELESSNESS UNSPECIFIED: Z59.00

## 2023-10-21 ENCOUNTER — HOSPITAL ENCOUNTER (INPATIENT)
Dept: HOSPITAL 54 - ER | Age: 38
LOS: 2 days | Discharge: HOME | DRG: 69 | End: 2023-10-23
Attending: NURSE PRACTITIONER | Admitting: NURSE PRACTITIONER
Payer: MEDICARE

## 2023-10-21 VITALS — OXYGEN SATURATION: 98 % | DIASTOLIC BLOOD PRESSURE: 71 MMHG | TEMPERATURE: 98.4 F | SYSTOLIC BLOOD PRESSURE: 112 MMHG

## 2023-10-21 VITALS — BODY MASS INDEX: 32.87 KG/M2 | HEIGHT: 64 IN | WEIGHT: 192.5 LBS

## 2023-10-21 DIAGNOSIS — Z91.199: ICD-10-CM

## 2023-10-21 DIAGNOSIS — Z88.0: ICD-10-CM

## 2023-10-21 DIAGNOSIS — J45.909: ICD-10-CM

## 2023-10-21 DIAGNOSIS — R04.0: ICD-10-CM

## 2023-10-21 DIAGNOSIS — I25.2: ICD-10-CM

## 2023-10-21 DIAGNOSIS — R20.0: ICD-10-CM

## 2023-10-21 DIAGNOSIS — R29.703: ICD-10-CM

## 2023-10-21 DIAGNOSIS — E11.42: ICD-10-CM

## 2023-10-21 DIAGNOSIS — Z59.00: ICD-10-CM

## 2023-10-21 DIAGNOSIS — Z79.84: ICD-10-CM

## 2023-10-21 DIAGNOSIS — D68.59: ICD-10-CM

## 2023-10-21 DIAGNOSIS — Z79.51: ICD-10-CM

## 2023-10-21 DIAGNOSIS — G81.91: ICD-10-CM

## 2023-10-21 DIAGNOSIS — Z79.4: ICD-10-CM

## 2023-10-21 DIAGNOSIS — E78.00: ICD-10-CM

## 2023-10-21 DIAGNOSIS — I10: ICD-10-CM

## 2023-10-21 DIAGNOSIS — Z79.899: ICD-10-CM

## 2023-10-21 DIAGNOSIS — Z79.82: ICD-10-CM

## 2023-10-21 DIAGNOSIS — Z86.73: ICD-10-CM

## 2023-10-21 DIAGNOSIS — E11.65: ICD-10-CM

## 2023-10-21 DIAGNOSIS — G45.9: Primary | ICD-10-CM

## 2023-10-21 DIAGNOSIS — E66.01: ICD-10-CM

## 2023-10-21 LAB
APPEARANCE UR: CLEAR
APTT PPP: 32.2 SEC (ref 24.3–34.3)
BACTERIA UR CULT: NO
BASOPHILS # BLD AUTO: 0.1 K/UL (ref 0–0.2)
BASOPHILS NFR BLD AUTO: 1.1 % (ref 0–2)
BILIRUB UR QL STRIP: NEGATIVE
BUN SERPL-MCNC: 12 MG/DL (ref 7–18)
CALCIUM SERPL-MCNC: 9.8 MG/DL (ref 8.5–10.1)
CHLORIDE SERPL-SCNC: 100 MMOL/L (ref 98–107)
CO2 SERPL-SCNC: 27 MMOL/L (ref 21–32)
COLOR UR: YELLOW
CREAT SERPL-MCNC: 0.6 MG/DL (ref 0.6–1.3)
EOSINOPHIL # BLD AUTO: 0.1 K/UL (ref 0–0.7)
EOSINOPHIL NFR BLD AUTO: 0.7 % (ref 0–6)
EOSINOPHIL NFR BLD MANUAL: 1 % (ref 0–4)
ERYTHROCYTE [DISTWIDTH] IN BLOOD BY AUTOMATED COUNT: 17.7 % (ref 11.5–15)
GLUCOSE SERPL-MCNC: 258 MG/DL (ref 74–106)
GLUCOSE UR STRIP-MCNC: (no result) MG/DL
HCT VFR BLD AUTO: 47 % (ref 39–51)
HGB BLD-MCNC: 15.1 G/DL (ref 13.5–17.5)
HGB UR QL STRIP: NEGATIVE ERY/UL
INR PPP: 0.99 (ref 0.91–1.1)
KETONES UR STRIP-MCNC: (no result) MG/DL
LEUKOCYTE ESTERASE UR QL STRIP: NEGATIVE
LYMPHOCYTES NFR BLD AUTO: 3.8 K/UL (ref 0.8–4.8)
LYMPHOCYTES NFR BLD AUTO: 36.3 % (ref 20–44)
LYMPHOCYTES NFR BLD MANUAL: 34 % (ref 16–48)
MCH RBC QN AUTO: 24 PG (ref 26–33)
MCHC RBC AUTO-ENTMCNC: 32 G/DL (ref 31–36)
MCV RBC AUTO: 75 FL (ref 80–96)
MONOCYTES NFR BLD AUTO: 0.6 K/UL (ref 0.1–1.3)
MONOCYTES NFR BLD AUTO: 5.6 % (ref 2–12)
MONOCYTES NFR BLD MANUAL: 6 % (ref 0–11)
NEUTROPHILS # BLD AUTO: 5.9 K/UL (ref 1.8–8.9)
NEUTROPHILS NFR BLD AUTO: 56.3 % (ref 43–81)
NEUTS BAND NFR BLD MANUAL: 1 % (ref 0–5)
NEUTS SEG NFR BLD MANUAL: 58 % (ref 42–76)
NITRITE UR QL STRIP: NEGATIVE
PH UR STRIP: 5.5 [PH] (ref 5–8)
PLATELET # BLD AUTO: 210 K/UL (ref 150–450)
PLATELET BLD QL SMEAR: ADEQUATE
POTASSIUM SERPL-SCNC: 4.2 MMOL/L (ref 3.5–5.1)
PROT UR QL STRIP: (no result) MG/DL
PROTHROMBIN TIME: 10.5 SECS (ref 9.2–11.1)
RBC # BLD AUTO: 6.31 MIL/UL (ref 4.5–6)
RBC #/AREA URNS HPF: (no result) /HPF (ref 0–2)
SODIUM SERPL-SCNC: 136 MMOL/L (ref 136–145)
SP GR UR STRIP: >1.03 (ref 1–1.03)
UROBILINOGEN UR STRIP-MCNC: 0.2 EU/DL
WBC #/AREA URNS HPF: (no result) /HPF (ref 0–3)
WBC NRBC COR # BLD AUTO: 10.4 K/UL (ref 4.3–11)

## 2023-10-21 PROCEDURE — G0378 HOSPITAL OBSERVATION PER HR: HCPCS

## 2023-10-21 RX ADMIN — Medication SCH EACH: at 21:53

## 2023-10-21 RX ADMIN — ENOXAPARIN SODIUM SCH MG: 40 INJECTION SUBCUTANEOUS at 22:00

## 2023-10-21 RX ADMIN — INSULIN HUMAN PRN UNITS: 100 INJECTION, SOLUTION PARENTERAL at 22:35

## 2023-10-21 SDOH — ECONOMIC STABILITY - HOUSING INSECURITY: HOMELESSNESS UNSPECIFIED: Z59.00

## 2023-10-22 VITALS — SYSTOLIC BLOOD PRESSURE: 94 MMHG | TEMPERATURE: 98.1 F | DIASTOLIC BLOOD PRESSURE: 67 MMHG | OXYGEN SATURATION: 98 %

## 2023-10-22 VITALS — DIASTOLIC BLOOD PRESSURE: 75 MMHG | SYSTOLIC BLOOD PRESSURE: 118 MMHG | TEMPERATURE: 98.3 F | OXYGEN SATURATION: 97 %

## 2023-10-22 VITALS — SYSTOLIC BLOOD PRESSURE: 126 MMHG | TEMPERATURE: 98 F | DIASTOLIC BLOOD PRESSURE: 75 MMHG | OXYGEN SATURATION: 99 %

## 2023-10-22 VITALS — SYSTOLIC BLOOD PRESSURE: 100 MMHG | OXYGEN SATURATION: 95 % | DIASTOLIC BLOOD PRESSURE: 67 MMHG | TEMPERATURE: 98.3 F

## 2023-10-22 VITALS — OXYGEN SATURATION: 95 % | TEMPERATURE: 97.9 F | DIASTOLIC BLOOD PRESSURE: 59 MMHG | SYSTOLIC BLOOD PRESSURE: 96 MMHG

## 2023-10-22 LAB
BASOPHILS # BLD AUTO: 0.1 K/UL (ref 0–0.2)
BASOPHILS NFR BLD AUTO: 1 % (ref 0–2)
BUN SERPL-MCNC: 14 MG/DL (ref 7–18)
CALCIUM SERPL-MCNC: 8 MG/DL (ref 8.5–10.1)
CHLORIDE SERPL-SCNC: 96 MMOL/L (ref 98–107)
CHOLEST SERPL-MCNC: 134 MG/DL (ref ?–200)
CO2 SERPL-SCNC: 27 MMOL/L (ref 21–32)
CREAT SERPL-MCNC: 0.7 MG/DL (ref 0.6–1.3)
EOSINOPHIL # BLD AUTO: 0.1 K/UL (ref 0–0.7)
EOSINOPHIL NFR BLD AUTO: 1.5 % (ref 0–6)
ERYTHROCYTE [DISTWIDTH] IN BLOOD BY AUTOMATED COUNT: 17.7 % (ref 11.5–15)
GLUCOSE SERPL-MCNC: 418 MG/DL (ref 74–106)
HCT VFR BLD AUTO: 42 % (ref 39–51)
HDLC SERPL-MCNC: 21 MG/DL (ref 40–60)
HGB BLD-MCNC: 13.9 G/DL (ref 13.5–17.5)
LDLC SERPL DIRECT ASSAY-MCNC: 42 MG/DL (ref 0–99)
LYMPHOCYTES NFR BLD AUTO: 4.5 K/UL (ref 0.8–4.8)
LYMPHOCYTES NFR BLD AUTO: 49.4 % (ref 20–44)
MAGNESIUM SERPL-MCNC: 1.7 MG/DL (ref 1.8–2.4)
MCH RBC QN AUTO: 25 PG (ref 26–33)
MCHC RBC AUTO-ENTMCNC: 33 G/DL (ref 31–36)
MCV RBC AUTO: 75 FL (ref 80–96)
MONOCYTES NFR BLD AUTO: 0.6 K/UL (ref 0.1–1.3)
MONOCYTES NFR BLD AUTO: 6.6 % (ref 2–12)
NEUTROPHILS # BLD AUTO: 3.8 K/UL (ref 1.8–8.9)
NEUTROPHILS NFR BLD AUTO: 41.5 % (ref 43–81)
PHOSPHATE SERPL-MCNC: 4 MG/DL (ref 2.5–4.9)
PLATELET # BLD AUTO: 203 K/UL (ref 150–450)
POTASSIUM SERPL-SCNC: 3.9 MMOL/L (ref 3.5–5.1)
RBC # BLD AUTO: 5.64 MIL/UL (ref 4.5–6)
SODIUM SERPL-SCNC: 132 MMOL/L (ref 136–145)
TRIGL SERPL-MCNC: 1316 MG/DL (ref 30–150)
TSH SERPL DL<=0.005 MIU/L-ACNC: 2.3 UIU/ML (ref 0.36–3.74)
VIT B12 SERPL-MCNC: 386 PG/ML (ref 193–986)
WBC NRBC COR # BLD AUTO: 9.1 K/UL (ref 4.3–11)

## 2023-10-22 RX ADMIN — INSULIN HUMAN PRN UNITS: 100 INJECTION, SOLUTION PARENTERAL at 11:26

## 2023-10-22 RX ADMIN — Medication SCH EACH: at 06:55

## 2023-10-22 RX ADMIN — ATORVASTATIN CALCIUM SCH MG: 40 TABLET, FILM COATED ORAL at 10:42

## 2023-10-22 RX ADMIN — CLOPIDOGREL BISULFATE SCH MG: 75 TABLET, FILM COATED ORAL at 20:43

## 2023-10-22 RX ADMIN — METFORMIN HYDROCHLORIDE SCH MG: 500 TABLET, FILM COATED ORAL at 16:34

## 2023-10-22 RX ADMIN — GABAPENTIN SCH MG: 100 CAPSULE ORAL at 16:34

## 2023-10-22 RX ADMIN — GABAPENTIN SCH MG: 100 CAPSULE ORAL at 13:13

## 2023-10-22 RX ADMIN — GABAPENTIN SCH MG: 100 CAPSULE ORAL at 10:43

## 2023-10-22 RX ADMIN — ASPIRIN 81 MG SCH MG: 81 TABLET ORAL at 13:13

## 2023-10-22 RX ADMIN — ENOXAPARIN SODIUM SCH MG: 40 INJECTION SUBCUTANEOUS at 21:12

## 2023-10-22 RX ADMIN — FAMOTIDINE SCH MG: 20 TABLET, FILM COATED ORAL at 10:42

## 2023-10-22 RX ADMIN — Medication SCH EACH: at 21:34

## 2023-10-22 RX ADMIN — FLUTICASONE PROPIONATE SCH SPRAY: 50 SPRAY, METERED NASAL at 21:17

## 2023-10-22 RX ADMIN — Medication SCH EACH: at 11:46

## 2023-10-22 RX ADMIN — METOPROLOL TARTRATE SCH MG: 25 TABLET, FILM COATED ORAL at 16:34

## 2023-10-22 RX ADMIN — METOPROLOL TARTRATE SCH MG: 25 TABLET, FILM COATED ORAL at 10:30

## 2023-10-22 RX ADMIN — METFORMIN HYDROCHLORIDE SCH MG: 500 TABLET, FILM COATED ORAL at 10:42

## 2023-10-22 RX ADMIN — ESCITALOPRAM OXALATE SCH MG: 10 TABLET, FILM COATED ORAL at 10:42

## 2023-10-22 RX ADMIN — INSULIN HUMAN PRN UNITS: 100 INJECTION, SOLUTION PARENTERAL at 17:11

## 2023-10-22 RX ADMIN — FLUTICASONE PROPIONATE SCH SPRAY: 50 SPRAY, METERED NASAL at 21:00

## 2023-10-22 RX ADMIN — DULOXETINE HYDROCHLORIDE SCH MG: 30 CAPSULE, DELAYED RELEASE ORAL at 10:42

## 2023-10-22 RX ADMIN — Medication SCH EACH: at 17:09

## 2023-10-22 RX ADMIN — INSULIN HUMAN PRN UNITS: 100 INJECTION, SOLUTION PARENTERAL at 06:54

## 2023-10-22 RX ADMIN — INSULIN HUMAN PRN UNITS: 100 INJECTION, SOLUTION PARENTERAL at 21:53

## 2023-10-23 VITALS — TEMPERATURE: 97.7 F | OXYGEN SATURATION: 96 % | DIASTOLIC BLOOD PRESSURE: 74 MMHG | SYSTOLIC BLOOD PRESSURE: 106 MMHG

## 2023-10-23 VITALS — TEMPERATURE: 97.3 F | DIASTOLIC BLOOD PRESSURE: 87 MMHG | SYSTOLIC BLOOD PRESSURE: 114 MMHG | OXYGEN SATURATION: 96 %

## 2023-10-23 VITALS — DIASTOLIC BLOOD PRESSURE: 74 MMHG | SYSTOLIC BLOOD PRESSURE: 106 MMHG

## 2023-10-23 RX ADMIN — DULOXETINE HYDROCHLORIDE SCH MG: 30 CAPSULE, DELAYED RELEASE ORAL at 08:56

## 2023-10-23 RX ADMIN — ATORVASTATIN CALCIUM SCH MG: 40 TABLET, FILM COATED ORAL at 08:56

## 2023-10-23 RX ADMIN — ESCITALOPRAM OXALATE SCH MG: 10 TABLET, FILM COATED ORAL at 08:57

## 2023-10-23 RX ADMIN — ASPIRIN 81 MG SCH MG: 81 TABLET ORAL at 08:56

## 2023-10-23 RX ADMIN — INSULIN HUMAN PRN UNITS: 100 INJECTION, SOLUTION PARENTERAL at 07:08

## 2023-10-23 RX ADMIN — METOPROLOL TARTRATE SCH MG: 25 TABLET, FILM COATED ORAL at 08:57

## 2023-10-23 RX ADMIN — FLUTICASONE PROPIONATE SCH SPRAY: 50 SPRAY, METERED NASAL at 09:01

## 2023-10-23 RX ADMIN — INSULIN HUMAN PRN UNITS: 100 INJECTION, SOLUTION PARENTERAL at 11:31

## 2023-10-23 RX ADMIN — GABAPENTIN SCH MG: 100 CAPSULE ORAL at 08:56

## 2023-10-23 RX ADMIN — FAMOTIDINE SCH MG: 20 TABLET, FILM COATED ORAL at 08:56

## 2023-10-23 RX ADMIN — METFORMIN HYDROCHLORIDE SCH MG: 500 TABLET, FILM COATED ORAL at 08:56

## 2023-10-23 RX ADMIN — CLOPIDOGREL BISULFATE SCH MG: 75 TABLET, FILM COATED ORAL at 08:56

## 2023-10-23 RX ADMIN — Medication SCH EACH: at 11:29

## 2023-10-23 RX ADMIN — Medication SCH EACH: at 07:05

## 2023-10-24 LAB — FOLATE SERPL-MCNC: 11.4 NG/ML (ref 3–?)
